# Patient Record
Sex: MALE | Race: WHITE | URBAN - METROPOLITAN AREA
[De-identification: names, ages, dates, MRNs, and addresses within clinical notes are randomized per-mention and may not be internally consistent; named-entity substitution may affect disease eponyms.]

---

## 2017-03-02 ENCOUNTER — RADIANT APPOINTMENT (OUTPATIENT)
Dept: GENERAL RADIOLOGY | Facility: CLINIC | Age: 47
End: 2017-03-02
Attending: FAMILY MEDICINE
Payer: COMMERCIAL

## 2017-03-02 ENCOUNTER — OFFICE VISIT (OUTPATIENT)
Dept: FAMILY MEDICINE | Facility: CLINIC | Age: 47
End: 2017-03-02
Payer: COMMERCIAL

## 2017-03-02 VITALS
OXYGEN SATURATION: 99 % | HEART RATE: 95 BPM | WEIGHT: 177 LBS | DIASTOLIC BLOOD PRESSURE: 90 MMHG | SYSTOLIC BLOOD PRESSURE: 142 MMHG | TEMPERATURE: 98.9 F | BODY MASS INDEX: 24.01 KG/M2

## 2017-03-02 DIAGNOSIS — R91.8 OPACITY OF LUNG ON IMAGING STUDY: ICD-10-CM

## 2017-03-02 DIAGNOSIS — J18.9 PNEUMONIA OF LEFT LOWER LOBE DUE TO INFECTIOUS ORGANISM: ICD-10-CM

## 2017-03-02 DIAGNOSIS — R05.9 COUGH: ICD-10-CM

## 2017-03-02 DIAGNOSIS — R05.9 COUGH: Primary | ICD-10-CM

## 2017-03-02 LAB — RADIOLOGIST FLAGS: ABNORMAL

## 2017-03-02 PROCEDURE — 99214 OFFICE O/P EST MOD 30 MIN: CPT | Performed by: FAMILY MEDICINE

## 2017-03-02 PROCEDURE — 71020 XR CHEST 2 VW: CPT

## 2017-03-02 RX ORDER — AZITHROMYCIN 250 MG/1
TABLET, FILM COATED ORAL
Qty: 6 TABLET | Refills: 0 | Status: SHIPPED | OUTPATIENT
Start: 2017-03-02 | End: 2017-03-02

## 2017-03-02 RX ORDER — CODEINE PHOSPHATE AND GUAIFENESIN 10; 100 MG/5ML; MG/5ML
1 SOLUTION ORAL
Qty: 120 ML | Refills: 0 | Status: SHIPPED | OUTPATIENT
Start: 2017-03-02 | End: 2017-03-23

## 2017-03-02 RX ORDER — ALBUTEROL SULFATE 90 UG/1
2 AEROSOL, METERED RESPIRATORY (INHALATION) EVERY 6 HOURS PRN
Qty: 1 INHALER | Refills: 0 | Status: SHIPPED | OUTPATIENT
Start: 2017-03-02

## 2017-03-02 RX ORDER — LEVOFLOXACIN 500 MG/1
500 TABLET, FILM COATED ORAL DAILY
Qty: 7 TABLET | Refills: 0 | Status: SHIPPED | OUTPATIENT
Start: 2017-03-02 | End: 2017-03-23

## 2017-03-02 NOTE — MR AVS SNAPSHOT
"              After Visit Summary   3/2/2017    Danielito Pack    MRN: [id number]           Patient Information     Date Of Birth          1970        Visit Information        Provider Department      3/2/2017 2:20 PM Rafi Murphy MD [facility] [place]        Today's Diagnoses     Cough    -  1    Acute bronchitis with symptoms > 10 days           Follow-ups after your visit        Who to contact     If you have questions or need follow up information about today's clinic visit or your schedule please contact [facility] [place] directly at [phone].  Normal or non-critical lab and imaging results will be communicated to you by [employer], letter or phone within 4 business days after the clinic has received the results. If you do not hear from us within 7 days, please contact the clinic through [employer] or phone. If you have a critical or abnormal lab result, we will notify you by phone as soon as possible.  Submit refill requests through [employer] or call your pharmacy and they will forward the refill request to us. Please allow 3 business days for your refill to be completed.          Additional Information About Your Visit        MyChart Information     [employer] lets you send messages to your doctor, view your test results, renew your prescriptions, schedule appointments and more. To sign up, go to [email or web address] . Click on \"Log in\" on the left side of the screen, which will take you to the Welcome page. Then click on \"Sign up Now\" on the right side of the page.     You will be asked to enter the access code listed below, as well as some personal information. Please follow the directions to create your username and password.     Your access code is: [email or web address]  Expires: 2017  2:56 PM     Your access code will  in 90 days. If you need help or a new code, please call your [facility] or [phone].        Care EveryWhere ID     This is your Care EveryWhere ID. This " could be used by other organizations to access your [place] medical records  [id number]        Your Vitals Were     Pulse Temperature Pulse Oximetry BMI (Body Mass Index)          95 98.9  F (37.2  C) (Tympanic) 99% 24.01 kg/m2         Blood Pressure from Last 3 Encounters:   03/02/17 142/90   07/06/10 122/74   06/22/10 124/74    Weight from Last 3 Encounters:   03/02/17 177 lb (80.3 kg)   07/06/10 187 lb (84.8 kg)   06/22/10 187 lb (84.8 kg)                 Today's Medication Changes          These changes are accurate as of: 3/2/17  2:56 PM.  If you have any questions, ask your nurse or doctor.               Start taking these medicines.        Dose/Directions    albuterol 108 (90 BASE) MCG/ACT Inhaler   Commonly known as:  PROAIR HFA/PROVENTIL HFA/VENTOLIN HFA   Used for:  Cough   Started by:  Rafi Murphy MD        Dose:  2 puff   Inhale 2 puffs into the lungs every 6 hours as needed for shortness of breath / dyspnea or wheezing   Quantity:  1 Inhaler   Refills:  0       azithromycin 250 MG tablet   Commonly known as:  ZITHROMAX   Used for:  Acute bronchitis with symptoms > 10 days, Cough   Started by:  Rafi Murphy MD        Two tablets first day, then one tablet daily for four days.   Quantity:  6 tablet   Refills:  0       guaiFENesin-codeine 100-10 MG/5ML Soln solution   Commonly known as:  ROBITUSSIN AC   Used for:  Cough   Started by:  Rafi Murphy MD        Dose:  1 tsp.   Take 5 mLs by mouth every evening as needed for cough   Quantity:  120 mL   Refills:  0            Where to get your medicines      These medications were sent to [id number] - [place] - [address]  AT [facility] [address]  [address] [address]     Phone:  [phone]     albuterol 108 (90 BASE) MCG/ACT Inhaler    azithromycin 250 MG tablet         Some of these will need a paper prescription and others can be bought over the counter.  Ask your nurse if you have questions.     Bring a paper  prescription for each of these medications     guaiFENesin-codeine 100-10 MG/5ML Soln solution                Primary Care Provider Office Phone # Fax #    Chris Sheets [phone]       XXX  XXX XXX  XXX [place] [address]        Thank you!     Thank you for choosing [facility]  for your care. Our goal is always to provide you with excellent care. Hearing back from our patients is one way we can continue to improve our services. Please take a few minutes to complete the written survey that you may receive in the mail after your visit with us. Thank you!             Your Updated Medication List - Protect others around you: Learn how to safely use, store and throw away your medicines at www.disposemymeds.org.          This list is accurate as of: 3/2/17  2:56 PM.  Always use your most recent med list.                   Brand Name Dispense Instructions for use    albuterol 108 (90 BASE) MCG/ACT Inhaler    PROAIR HFA/PROVENTIL HFA/VENTOLIN HFA    1 Inhaler    Inhale 2 puffs into the lungs every 6 hours as needed for shortness of breath / dyspnea or wheezing       azithromycin 250 MG tablet    ZITHROMAX    6 tablet    Two tablets first day, then one tablet daily for four days.       guaiFENesin-codeine 100-10 MG/5ML Soln solution    ROBITUSSIN AC    120 mL    Take 5 mLs by mouth every evening as needed for cough       loratadine-pseudoePHEDrine  MG per 24 hr tablet    CLARITIN-D 24-hour    30 tablet    Take 1 tablet by mouth daily.

## 2017-03-02 NOTE — PROGRESS NOTES
SUBJECTIVE:                                                    Danielito Pack is a 46 year old male who presents to clinic today for the following health issues:    Acute Illness   Acute illness concerns: cough  He does smoking.  Onset: 8-9 days     Fever: no     Chills/Sweats: no    Headache (location?): YES    Sinus Pressure:YES    Conjunctivitis:  no    Ear Pain: no    Rhinorrhea: no     Congestion: YES    Sore Throat: no      Cough: YES    Wheeze: no     Decreased Appetite: no    Nausea: no    Vomiting: no    Diarrhea:  no    Dysuria/Freq.: no    Fatigue/Achiness: no     Sick/Strep Exposure: no     Therapies Tried and outcome: mucinex dm           Problem list and histories reviewed & adjusted, as indicated.  Additional history: as documented    Patient Active Problem List   Diagnosis     Elevated homocysteine (H)     CARDIOVASCULAR SCREENING; LDL GOAL LESS THAN 160     Opacity of lung on imaging study     History reviewed. No pertinent past surgical history.    Social History   Substance Use Topics     Smoking status: Current Every Day Smoker     Smokeless tobacco: Not on file      Comment: 1 1 1/2  paks/day     Alcohol use Yes      Comment: 12/week     Family History   Problem Relation Age of Onset     Hypertension Mother      Hypertension Father      Family History Negative Sister      Family History Negative Brother          Current Outpatient Prescriptions   Medication Sig Dispense Refill     albuterol (PROAIR HFA/PROVENTIL HFA/VENTOLIN HFA) 108 (90 BASE) MCG/ACT Inhaler Inhale 2 puffs into the lungs every 6 hours as needed for shortness of breath / dyspnea or wheezing 1 Inhaler 0     guaiFENesin-codeine (ROBITUSSIN AC) 100-10 MG/5ML SOLN solution Take 5 mLs by mouth every evening as needed for cough 120 mL 0     levofloxacin (LEVAQUIN) 500 MG tablet Take 1 tablet (500 mg) by mouth daily 7 tablet 0     loratadine-pseudoePHEDrine (CLARITIN-D 24-HOUR)  MG per tablet Take 1 tablet by mouth daily. (Patient  not taking: Reported on 3/2/2017) 30 tablet 5       Reviewed and updated as needed this visit by clinical staff  Tobacco  Allergies  Meds  Med Hx  Surg Hx  Fam Hx  Soc Hx      Reviewed and updated as needed this visit by Provider         ROS:  C: NEGATIVE for fever, chills, change in weight  E/M: NEGATIVE for ear, mouth and throat problems  RESP:POSITIVE for cough-non productive  CV: NEGATIVE for chest pain, palpitations or peripheral edema    OBJECTIVE:                                                    /90  Pulse 95  Temp 98.9  F (37.2  C) (Tympanic)  Wt 177 lb (80.3 kg)  SpO2 99%  BMI 24.01 kg/m2  Body mass index is 24.01 kg/(m^2).   GENERAL: healthy, alert, well nourished, well hydrated, no distress  HENT: ear canals- normal; TMs- normal; Nose- normal; Mouth- no ulcers, no lesions  Mild wheezing.  NECK: no tenderness, no adenopathy, no asymmetry, no masses, no stiffness; thyroid- normal to palpation  RESP: lungs clear to auscultation - no rales, no rhonchi, no wheezes  CV: regular rates and rhythm, normal S1 S2, no S3 or S4 and no murmur, no click or rub -       ASSESSMENT/PLAN:                                                        ICD-10-CM    1. Cough R05 XR Chest 2 Views     albuterol (PROAIR HFA/PROVENTIL HFA/VENTOLIN HFA) 108 (90 BASE) MCG/ACT Inhaler     guaiFENesin-codeine (ROBITUSSIN AC) 100-10 MG/5ML SOLN solution     DISCONTINUED: azithromycin (ZITHROMAX) 250 MG tablet   2. Pneumonia of left lower lobe due to infectious organism J18.9 levofloxacin (LEVAQUIN) 500 MG tablet   3. Opacity of lung on imaging study R91.8        Patient CXR is reviewed,, suggested quitting smoking. As per report he may have some opacity on the  Left side, suggested of pneumonia or other etiologies.  Initially he was given azithromycin, after report from radiologist we will change to levofloxacin 500 mg QD for 7 day.  He is adviceed follow up in 3 week for another xray to rule out other etiologies or if persisit  may need CT scan.  Cough medication as needed      Rafi Murphy MD  [facility]

## 2017-03-02 NOTE — NURSING NOTE
Chief Complaint   Patient presents with     Cough       Initial /90  Pulse 95  Temp 98.9  F (37.2  C) (Tympanic)  Wt 177 lb (80.3 kg)  SpO2 99%  BMI 24.01 kg/m2 Estimated body mass index is 24.01 kg/(m^2) as calculated from the following:    Height as of 5/21/10: 6' (1.829 m).    Weight as of this encounter: 177 lb (80.3 kg).  Medication Reconciliation: complete    Current Outpatient Prescriptions   Medication Sig Dispense Refill     loratadine-pseudoePHEDrine (CLARITIN-D 24-HOUR)  MG per tablet Take 1 tablet by mouth daily. (Patient not taking: Reported on 3/2/2017) 30 tablet 5       Jay M, [facility]

## 2017-03-23 ENCOUNTER — OFFICE VISIT (OUTPATIENT)
Dept: FAMILY MEDICINE | Facility: CLINIC | Age: 47
End: 2017-03-23
Payer: COMMERCIAL

## 2017-03-23 ENCOUNTER — RADIANT APPOINTMENT (OUTPATIENT)
Dept: GENERAL RADIOLOGY | Facility: CLINIC | Age: 47
End: 2017-03-23
Attending: FAMILY MEDICINE
Payer: COMMERCIAL

## 2017-03-23 VITALS
DIASTOLIC BLOOD PRESSURE: 90 MMHG | SYSTOLIC BLOOD PRESSURE: 136 MMHG | TEMPERATURE: 97.8 F | WEIGHT: 175 LBS | OXYGEN SATURATION: 98 % | HEIGHT: 72 IN | HEART RATE: 104 BPM | BODY MASS INDEX: 23.7 KG/M2

## 2017-03-23 DIAGNOSIS — R91.8 OPACITY OF LUNG ON IMAGING STUDY: Primary | ICD-10-CM

## 2017-03-23 DIAGNOSIS — R91.8 OPACITY OF LUNG ON IMAGING STUDY: ICD-10-CM

## 2017-03-23 DIAGNOSIS — R05.9 COUGH: ICD-10-CM

## 2017-03-23 PROCEDURE — 99214 OFFICE O/P EST MOD 30 MIN: CPT | Performed by: FAMILY MEDICINE

## 2017-03-23 PROCEDURE — 71020 XR CHEST 2 VW: CPT

## 2017-03-23 NOTE — PROGRESS NOTES
SUBJECTIVE:                                                    Danielito Pack is a 47 year old male who presents to clinic today for the following health issues:    Concern - Follow up pneumonia      Onset: one month since cough started - pt has improved since last visit .    Patient had CXR done,for evaluation of couch, which indicate possible pneumonia and opacity. He camer to day for rechek, his cough and other symptoms have improved., denies any recent weight loss.    No other systemic symptoms.        {    Problem list and histories reviewed & adjusted, as indicated.  Additional history: as documented    Patient Active Problem List   Diagnosis     Elevated homocysteine (H)     CARDIOVASCULAR SCREENING; LDL GOAL LESS THAN 160     Opacity of lung on imaging study     No past surgical history on file.    Social History   Substance Use Topics     Smoking status: Current Every Day Smoker     Smokeless tobacco: Not on file      Comment: 1 1 1/2  paks/day     Alcohol use Yes      Comment: 12/week     Family History   Problem Relation Age of Onset     Hypertension Mother      Hypertension Father      Family History Negative Sister      Family History Negative Brother          Current Outpatient Prescriptions   Medication Sig Dispense Refill     albuterol (PROAIR HFA/PROVENTIL HFA/VENTOLIN HFA) 108 (90 BASE) MCG/ACT Inhaler Inhale 2 puffs into the lungs every 6 hours as needed for shortness of breath / dyspnea or wheezing 1 Inhaler 0     loratadine-pseudoePHEDrine (CLARITIN-D 24-HOUR)  MG per tablet Take 1 tablet by mouth daily. 30 tablet 5     Labs reviewed in EPIC    Reviewed and updated as needed this visit by clinical staff  Tobacco  Allergies  Meds  Soc Hx      Reviewed and updated as needed this visit by Provider         ROS:  C: NEGATIVE for fever, chills, change in weight  E/M: NEGATIVE for ear, mouth and throat problems  R: NEGATIVE for significant cough or SOB  CV: NEGATIVE for chest pain, palpitations  or peripheral edema    OBJECTIVE:                                                    /90 (BP Location: Right arm)  Pulse 104  Temp 97.8  F (36.6  C) (Tympanic)  Ht 6' (1.829 m)  Wt 175 lb (79.4 kg)  SpO2 98%  BMI 23.73 kg/m2  Body mass index is 23.73 kg/(m^2).   GENERAL: healthy, alert, well nourished, well hydrated, no distress  HENT: ear canals- normal; TMs- normal; Nose- normal; Mouth- no ulcers, no lesions  NECK: no tenderness, no adenopathy, no asymmetry, no masses, no stiffness; thyroid- normal to palpation  RESP: lungs clear to auscultation - no rales, no rhonchi, no wheezes  CV: regular rates and rhythm, normal S1 S2, no S3 or S4 and no murmur, no click or rub -         ASSESSMENT/PLAN:                                                        ICD-10-CM    1. Opacity of lung on imaging study R91.8 XR Chest 2 Views     CT Chest w/o Contrast   2. Cough R05 XR Chest 2 Views     CT Chest w/o Contrast       CXR is done, which indicate similar opacity, which was seen in previous xray, will get CT scan and will follow up on that. Once done we will make further recommndations.    Rafi Murphy MD  [facility]

## 2017-03-23 NOTE — MR AVS SNAPSHOT
"              After Visit Summary   3/23/2017    Danielito Pack    MRN: [id number]           Patient Information     Date Of Birth          1970        Visit Information        Provider Department      3/23/2017 10:00 AM Rafi Murphy MD [facility] [place]        Today's Diagnoses     Opacity of lung on imaging study    -  1    Cough           Follow-ups after your visit        Future tests that were ordered for you today     Open Future Orders        Priority Expected Expires Ordered    CT Chest w/o Contrast Routine  3/23/2018 3/23/2017            Who to contact     If you have questions or need follow up information about today's clinic visit or your schedule please contact [facility] [place] directly at [phone].  Normal or non-critical lab and imaging results will be communicated to you by [employer], letter or phone within 4 business days after the clinic has received the results. If you do not hear from us within 7 days, please contact the clinic through [employer] or phone. If you have a critical or abnormal lab result, we will notify you by phone as soon as possible.  Submit refill requests through [employer] or call your pharmacy and they will forward the refill request to us. Please allow 3 business days for your refill to be completed.          Additional Information About Your Visit        [employer] Information     [employer] lets you send messages to your doctor, view your test results, renew your prescriptions, schedule appointments and more. To sign up, go to [email or web address] . Click on \"Log in\" on the left side of the screen, which will take you to the Welcome page. Then click on \"Sign up Now\" on the right side of the page.     You will be asked to enter the access code listed below, as well as some personal information. Please follow the directions to create your username and password.     Your access code is: [email or web address]  Expires: 5/31/2017  3:56 PM     Your access code will "  in 90 days. If you need help or a new code, please call your [place] clinic or [phone].        Care EveryWhere ID     This is your Care EveryWhere ID. This could be used by other organizations to access your [place] medical records  [id number]        Your Vitals Were     Pulse Temperature Height Pulse Oximetry BMI (Body Mass Index)       104 97.8  F (36.6  C) (Tympanic) 6' (1.829 m) 98% 23.73 kg/m2        Blood Pressure from Last 3 Encounters:   17 136/90   17 142/90   07/06/10 122/74    Weight from Last 3 Encounters:   17 175 lb (79.4 kg)   17 177 lb (80.3 kg)   07/06/10 187 lb (84.8 kg)               Primary Care Provider Office Phone # Fax #    Chris Sheets [phone]       XXX  XXX XXX  XXX [place] [address]        Thank you!     Thank you for choosing [facility] [place]  for your care. Our goal is always to provide you with excellent care. Hearing back from our patients is one way we can continue to improve our services. Please take a few minutes to complete the written survey that you may receive in the mail after your visit with us. Thank you!             Your Updated Medication List - Protect others around you: Learn how to safely use, store and throw away your medicines at www.disposemymeds.org.          This list is accurate as of: 3/23/17 10:41 AM.  Always use your most recent med list.                   Brand Name Dispense Instructions for use    albuterol 108 (90 BASE) MCG/ACT Inhaler    PROAIR HFA/PROVENTIL HFA/VENTOLIN HFA    1 Inhaler    Inhale 2 puffs into the lungs every 6 hours as needed for shortness of breath / dyspnea or wheezing       loratadine-pseudoePHEDrine  MG per 24 hr tablet    CLARITIN-D 24-hour    30 tablet    Take 1 tablet by mouth daily.

## 2017-03-23 NOTE — NURSING NOTE
Chief Complaint   Patient presents with     Cough     follow up pneumonia        Initial /90 (BP Location: Right arm)  Pulse 104  Temp 97.8  F (36.6  C) (Tympanic)  Ht 6' (1.829 m)  Wt 175 lb (79.4 kg)  SpO2 98%  BMI 23.73 kg/m2 Estimated body mass index is 23.73 kg/(m^2) as calculated from the following:    Height as of this encounter: 6' (1.829 m).    Weight as of this encounter: 175 lb (79.4 kg).  Medication Reconciliation: complete    Current Outpatient Prescriptions   Medication Sig Dispense Refill     albuterol (PROAIR HFA/PROVENTIL HFA/VENTOLIN HFA) 108 (90 BASE) MCG/ACT Inhaler Inhale 2 puffs into the lungs every 6 hours as needed for shortness of breath / dyspnea or wheezing 1 Inhaler 0     loratadine-pseudoePHEDrine (CLARITIN-D 24-HOUR)  MG per tablet Take 1 tablet by mouth daily. 30 tablet 5       Jay M, CMA

## 2017-04-03 ENCOUNTER — TRANSFERRED RECORDS (OUTPATIENT)
Dept: HEALTH INFORMATION MANAGEMENT | Facility: CLINIC | Age: 47
End: 2017-04-03

## 2017-04-03 ENCOUNTER — TELEPHONE (OUTPATIENT)
Dept: FAMILY MEDICINE | Facility: CLINIC | Age: 47
End: 2017-04-03

## 2017-04-03 DIAGNOSIS — Q25.72 PULMONARY ARTERIOVENOUS MALFORMATION: ICD-10-CM

## 2017-04-03 DIAGNOSIS — R93.89 ABNORMAL CAT SCAN: Primary | ICD-10-CM

## 2017-04-04 NOTE — TELEPHONE ENCOUNTER
Spoke with the patient, dicussed AV malformation as suggested by radiologist. Suggested IR follow up, referral provided at [facility]>

## 2017-04-11 ENCOUNTER — TRANSFERRED RECORDS (OUTPATIENT)
Dept: HEALTH INFORMATION MANAGEMENT | Facility: CLINIC | Age: 47
End: 2017-04-11

## 2017-04-12 ENCOUNTER — TRANSFERRED RECORDS (OUTPATIENT)
Dept: HEALTH INFORMATION MANAGEMENT | Facility: CLINIC | Age: 47
End: 2017-04-12

## 2017-04-14 ENCOUNTER — TELEPHONE (OUTPATIENT)
Dept: FAMILY MEDICINE | Facility: CLINIC | Age: 47
End: 2017-04-14

## 2017-05-03 ENCOUNTER — TRANSFERRED RECORDS (OUTPATIENT)
Dept: HEALTH INFORMATION MANAGEMENT | Facility: CLINIC | Age: 47
End: 2017-05-03

## 2017-05-04 ENCOUNTER — TELEPHONE (OUTPATIENT)
Dept: FAMILY MEDICINE | Facility: CLINIC | Age: 47
End: 2017-05-04